# Patient Record
Sex: MALE | Race: WHITE | Employment: OTHER | URBAN - METROPOLITAN AREA
[De-identification: names, ages, dates, MRNs, and addresses within clinical notes are randomized per-mention and may not be internally consistent; named-entity substitution may affect disease eponyms.]

---

## 2017-09-28 PROBLEM — F51.01 PRIMARY INSOMNIA: Chronic | Status: ACTIVE | Noted: 2017-09-28

## 2017-09-28 PROBLEM — N52.9 ERECTILE DYSFUNCTION OF ORGANIC ORIGIN: Chronic | Status: ACTIVE | Noted: 2017-09-28

## 2017-09-28 PROBLEM — I10 BENIGN ESSENTIAL HTN: Chronic | Status: ACTIVE | Noted: 2017-09-28

## 2017-09-28 PROBLEM — F41.9 ANXIETY: Chronic | Status: ACTIVE | Noted: 2017-09-28

## 2017-09-28 PROBLEM — N52.01 ERECTILE DYSFUNCTION DUE TO ARTERIAL INSUFFICIENCY: Chronic | Status: ACTIVE | Noted: 2017-09-28

## 2017-10-23 PROBLEM — E78.2 HYPERLIPEMIA, MIXED: Chronic | Status: ACTIVE | Noted: 2017-10-23

## 2017-11-28 PROBLEM — R09.81 CHRONIC NASAL CONGESTION: Chronic | Status: ACTIVE | Noted: 2017-11-28

## 2017-12-12 PROBLEM — M50.30 DDD (DEGENERATIVE DISC DISEASE), CERVICAL: Status: ACTIVE | Noted: 2017-12-12

## 2017-12-12 PROBLEM — M51.36 DDD (DEGENERATIVE DISC DISEASE), LUMBAR: Status: ACTIVE | Noted: 2017-12-12

## 2018-05-15 ENCOUNTER — HOSPITAL ENCOUNTER (OUTPATIENT)
Dept: GENERAL RADIOLOGY | Age: 73
Discharge: HOME | End: 2018-05-15
Payer: MEDICARE

## 2018-05-15 DIAGNOSIS — R06.2 WHEEZING: ICD-10-CM

## 2018-05-15 DIAGNOSIS — R05.9 COUGH: ICD-10-CM

## 2018-05-15 PROCEDURE — 71046 X-RAY EXAM CHEST 2 VIEWS: CPT

## 2018-10-04 PROBLEM — Z77.090 H/O ASBESTOS EXPOSURE: Chronic | Status: ACTIVE | Noted: 2018-10-04

## 2018-10-04 PROBLEM — M50.30 DDD (DEGENERATIVE DISC DISEASE), CERVICAL: Chronic | Status: ACTIVE | Noted: 2017-12-12

## 2018-10-04 PROBLEM — N52.9 ERECTILE DYSFUNCTION OF ORGANIC ORIGIN: Chronic | Status: RESOLVED | Noted: 2017-09-28 | Resolved: 2018-10-04

## 2018-10-04 PROBLEM — J44.9 CHRONIC OBSTRUCTIVE PULMONARY DISEASE (HCC): Chronic | Status: ACTIVE | Noted: 2018-10-04

## 2018-10-04 PROBLEM — M51.36 DDD (DEGENERATIVE DISC DISEASE), LUMBAR: Chronic | Status: ACTIVE | Noted: 2017-12-12

## 2019-01-15 ENCOUNTER — HOSPITAL ENCOUNTER (OUTPATIENT)
Dept: GENERAL RADIOLOGY | Age: 74
Discharge: HOME | End: 2019-01-15
Payer: MEDICARE

## 2019-01-15 DIAGNOSIS — J44.9 CHRONIC OBSTRUCTIVE PULMONARY DISEASE, UNSPECIFIED COPD TYPE (HCC): ICD-10-CM

## 2019-01-15 PROCEDURE — 71046 X-RAY EXAM CHEST 2 VIEWS: CPT

## 2020-01-07 ENCOUNTER — HOSPITAL ENCOUNTER (OUTPATIENT)
Dept: CT IMAGING | Age: 75
Discharge: HOME | End: 2020-01-07

## 2020-01-07 DIAGNOSIS — Z77.090 H/O ASBESTOS EXPOSURE: Chronic | ICD-10-CM

## 2020-01-08 NOTE — PROGRESS NOTES
Opacities seen in RUL and lingula and with chronic cough and previous hx of asbestos exposure recommend following up with pulmonology and will refer back for managment

## 2020-02-20 PROBLEM — J01.00 ACUTE NON-RECURRENT MAXILLARY SINUSITIS: Status: ACTIVE | Noted: 2020-02-20
